# Patient Record
Sex: MALE | Race: WHITE | NOT HISPANIC OR LATINO | Employment: OTHER | URBAN - METROPOLITAN AREA
[De-identification: names, ages, dates, MRNs, and addresses within clinical notes are randomized per-mention and may not be internally consistent; named-entity substitution may affect disease eponyms.]

---

## 2017-02-12 ENCOUNTER — HOSPITAL ENCOUNTER (EMERGENCY)
Facility: MEDICAL CENTER | Age: 72
End: 2017-02-12
Attending: EMERGENCY MEDICINE
Payer: MEDICARE

## 2017-02-12 VITALS
WEIGHT: 177.91 LBS | HEART RATE: 69 BPM | DIASTOLIC BLOOD PRESSURE: 60 MMHG | SYSTOLIC BLOOD PRESSURE: 103 MMHG | HEIGHT: 71 IN | BODY MASS INDEX: 24.91 KG/M2 | RESPIRATION RATE: 16 BRPM | OXYGEN SATURATION: 96 % | TEMPERATURE: 98.7 F

## 2017-02-12 DIAGNOSIS — S39.012A LUMBOSACRAL STRAIN, INITIAL ENCOUNTER: ICD-10-CM

## 2017-02-12 PROCEDURE — 99284 EMERGENCY DEPT VISIT MOD MDM: CPT

## 2017-02-12 PROCEDURE — 700111 HCHG RX REV CODE 636 W/ 250 OVERRIDE (IP): Performed by: EMERGENCY MEDICINE

## 2017-02-12 PROCEDURE — 96372 THER/PROPH/DIAG INJ SC/IM: CPT

## 2017-02-12 RX ORDER — METHYLPREDNISOLONE 4 MG/1
TABLET ORAL
Qty: 1 KIT | Refills: 0 | Status: SHIPPED | OUTPATIENT
Start: 2017-02-12 | End: 2018-08-14

## 2017-02-12 RX ORDER — KETOROLAC TROMETHAMINE 30 MG/ML
30 INJECTION, SOLUTION INTRAMUSCULAR; INTRAVENOUS ONCE
Status: COMPLETED | OUTPATIENT
Start: 2017-02-12 | End: 2017-02-12

## 2017-02-12 RX ORDER — TRAMADOL HYDROCHLORIDE 50 MG/1
50-100 TABLET ORAL EVERY 6 HOURS PRN
Qty: 20 TAB | Refills: 0 | Status: SHIPPED | OUTPATIENT
Start: 2017-02-12

## 2017-02-12 RX ADMIN — KETOROLAC TROMETHAMINE 30 MG: 30 INJECTION, SOLUTION INTRAMUSCULAR; INTRAVENOUS at 10:06

## 2017-02-12 ASSESSMENT — PAIN SCALES - GENERAL
PAINLEVEL_OUTOF10: 9
PAINLEVEL_OUTOF10: 7

## 2017-02-12 NOTE — ED AVS SNAPSHOT
2/12/2017          Roger Render  [address] [facility] [place] [address]    Dear Roger:    Critical access hospital wants to ensure your discharge home is safe and you or your loved ones have had all your questions answered regarding your care after you leave the hospital.    You may receive a telephone call within two days of your discharge.  This call is to make certain you understand your discharge instructions as well as ensure we provided you with the best care possible during your stay with us.     The call will only last approximately 3-5 minutes and will be done by a nurse.    Once again, we want to ensure your discharge home is safe and that you have a clear understanding of any next steps in your care.  If you have any questions or concerns, please do not hesitate to contact us, we are here for you.  Thank you for choosing [facility] for your healthcare needs.    Sincerely,    Olegario Mayers    [facility]

## 2017-02-12 NOTE — ED AVS SNAPSHOT
[employer] Access Code: [email or web address]  Expires: 3/14/2017 10:25 AM    [employer]  A secure, online tool to manage your health information     [employer]’s [employer]® is a secure, online tool that connects you to your personalized health information from the privacy of your home -- day or night - making it very easy for you to manage your healthcare. Once the activation process is completed, you can even access your medical information using the [employer] raquel, which is available for free in the Apple Raquel store or Google Play store.     [employer] provides the following levels of access (as shown below):   My Chart Features   [facility] Primary Care Doctor [facility]  Specialists [facility]  Urgent  Care [facility]  Primary Care  Doctor   Email your healthcare team securely and privately 24/7 X X X X   Manage appointments: schedule your next appointment; view details of past/upcoming appointments X      Request prescription refills. X      View recent personal medical records, including lab and immunizations X X X X   View health record, including health history, allergies, medications X X X X   Read reports about your outpatient visits, procedures, consult and ER notes X X X X   See your discharge summary, which is a recap of your hospital and/or ER visit that includes your diagnosis, lab results, and care plan. X X       How to register for [employer]:  1. Go to  [email or web address].  2. Click on the Sign Up Now box, which takes you to the New Member Sign Up page. You will need to provide the following information:  a. Enter your [employer] Access Code exactly as it appears at the top of this page. (You will not need to use this code after you’ve completed the sign-up process. If you do not sign up before the expiration date, you must request a new code.)   b. Enter your date of birth.   c. Enter your home email address.   d. Click Submit, and follow the next screen’s instructions.  3. Create a [employer] ID. This will be your [employer]  login ID and cannot be changed, so think of one that is secure and easy to remember.  4. Create a [employer] password. You can change your password at any time.  5. Enter your Password Reset Question and Answer. This can be used at a later time if you forget your password.   6. Enter your e-mail address. This allows you to receive e-mail notifications when new information is available in [employer].  7. Click Sign Up. You can now view your health information.    For assistance activating your [employer] account, call [phone]

## 2017-02-12 NOTE — ED AVS SNAPSHOT
Home Care Instructions                                                                                                                Roger Holliday   MRN: [id number]    Department:  [facility], Emergency Dept   Date of Visit:  2/12/2017            [facility], Emergency Dept    [address] [place] [address]    Phone:  [phone]      You were seen by     Rolando Mcguire M.D.      Your Diagnosis Was     Lumbosacral strain, initial encounter     S39.012A       These are the medications you received during your hospitalization from 02/12/2017 0936 to 02/12/2017 1026     Date/Time Order Dose Route Action    02/12/2017 1006 ketorolac (TORADOL) injection 30 mg 30 mg Intramuscular Given      Follow-up Information     1. Follow up with Giovanni Khan M.D. In 2 days.    Specialty:  Phys Med and Rehab    Why:  As needed, If symptoms worsen    Contact information    [address]  [place] [id number] [phone]        Medication Information     Review all of your home medications and newly ordered medications with your primary doctor and/or pharmacist as soon as possible. Follow medication instructions as directed by your doctor and/or pharmacist.     Please keep your complete medication list with you and share with your physician. Update the information when medications are discontinued, doses are changed, or new medications (including over-the-counter products) are added; and carry medication information at all times in the event of emergency situations.               Medication List      START taking these medications        Instructions    MethylPREDNISolone 4 MG Tbpk   Commonly known as:  MEDROL DOSEPAK    Use as directed       tramadol 50 MG Tabs   Commonly known as:  ULTRAM    Take 1-2 Tabs by mouth every 6 hours as needed for Moderate Pain (pain).   Dose:   mg                 Discharge Instructions       Back Pain, Adult  Back pain is  very common in adults. The cause of back pain is rarely dangerous and the pain often gets better over time. The cause of your back pain may not be known. Some common causes of back pain include:  · Strain of the muscles or ligaments supporting the spine.  · Wear and tear (degeneration) of the spinal disks.  · Arthritis.  · Direct injury to the back.  For many people, back pain may return. Since back pain is rarely dangerous, most people can learn to manage this condition on their own.  HOME CARE INSTRUCTIONS  Watch your back pain for any changes. The following actions may help to lessen any discomfort you are feeling:  · Remain active. It is stressful on your back to sit or  one place for long periods of time. Do not sit, drive, or  one place for more than 30 minutes at a time. Take short walks on even surfaces as soon as you are able. Try to increase the length of time you walk each day.  · Exercise regularly as directed by your health care provider. Exercise helps your back heal faster. It also helps avoid future injury by keeping your muscles strong and flexible.  · Do not stay in bed. Resting more than 1-2 days can delay your recovery.  · Pay attention to your body when you bend and lift. The most comfortable positions are those that put less stress on your recovering back. Always use proper lifting techniques, including:  ¨ Bending your knees.  ¨ Keeping the load close to your body.  ¨ Avoiding twisting.  · Find a comfortable position to sleep. Use a firm mattress and lie on your side with your knees slightly bent. If you lie on your back, put a pillow under your knees.  · Avoid feeling anxious or stressed. Stress increases muscle tension and can worsen back pain. It is important to recognize when you are anxious or stressed and learn ways to manage it, such as with exercise.  · Take medicines only as directed by your health care provider. Over-the-counter medicines to reduce pain and  inflammation are often the most helpful. Your health care provider may prescribe muscle relaxant drugs. These medicines help dull your pain so you can more quickly return to your normal activities and healthy exercise.  · Apply ice to the injured area:  ¨ Put ice in a plastic bag.  ¨ Place a towel between your skin and the bag.  ¨ Leave the ice on for 20 minutes, 2-3 times a day for the first 2-3 days. After that, ice and heat may be alternated to reduce pain and spasms.  · Maintain a healthy weight. Excess weight puts extra stress on your back and makes it difficult to maintain good posture.  SEEK MEDICAL CARE IF:  · You have pain that is not relieved with rest or medicine.  · You have increasing pain going down into the legs or buttocks.  · You have pain that does not improve in one week.  · You have night pain.  · You lose weight.  · You have a fever or chills.  SEEK IMMEDIATE MEDICAL CARE IF:   · You develop new bowel or bladder control problems.  · You have unusual weakness or numbness in your arms or legs.  · You develop nausea or vomiting.  · You develop abdominal pain.  · You feel faint.     This information is not intended to replace advice given to you by your health care provider. Make sure you discuss any questions you have with your health care provider.     Document Released: 12/18/2006 Document Revised: 01/08/2016 Document Reviewed: 04/21/2015  Elsevier Interactive Patient Education ©2016 [employer]            Patient Information     Patient Information    Following emergency treatment: all patient requiring follow-up care must return either to a private physician or a clinic if your condition worsens before you are able to obtain further medical attention, please return to the emergency room.     Billing Information    At [facility], we work to make the billing process streamlined for our patients.  Our Representatives are here to answer any questions you may have regarding your hospital bill.   If you have insurance coverage and have supplied your insurance information to us, we will submit a claim to your insurer on your behalf.  Should you have any questions regarding your bill, we can be reached online or by phone as follows:  Online: You are able pay your bills online or live chat with our representatives about any billing questions you may have. We are here to help Monday - Friday from 8:00am to 7:30pm and 9:00am - 12:00pm on Saturdays.  Please visit [email or web address]  for more information.   Phone:  [phone] or [phone]    Please note that your emergency physician, surgeon, pathologist, radiologist, anesthesiologist, and other specialists are not employed by [facility] and will therefore bill separately for their services.  Please contact them directly for any questions concerning their bills at the numbers below:     Emergency Physician Services:  [phone]  [facility]:  [phone]  [facility]:  [phone]  [facility]:  [phone]    1. Your final bill may vary from the amount quoted upon discharge if all procedures are not complete at that time, or if your doctor has additional procedures of which we are not aware. You will receive an additional bill if you return to the Emergency Department at [facility] for suture removal regardless of the facility of which the sutures were placed.     2. Please arrange for settlement of this account at the emergency registration.    3. All self-pay accounts are due in full at the time of treatment.  If you are unable to meet this obligation then payment is expected within 4-5 days.     4. If you have had radiology studies (CT, X-ray, Ultrasound, MRI), you have received a preliminary result during your emergency department visit. Please contact the radiology department [phone] to receive a copy of your final result. Please discuss the Final result with  your primary physician or with the follow up physician provided.     Crisis Hotline:  [place] Crisis Hotline:  4-472-RLPRBYG or 1-462.573.2249  [place] Crisis Hotline:    1-369.681.6035 or 831-069-3459         ED Discharge Follow Up Questions    1. In order to provide you with very good care, we would like to follow up with a phone call in the next few days.  May we have your permission to contact you?     YES /  NO    2. What is the best phone number to call you? (       )_____-__________    3. What is the best time to call you?      Morning  /  Afternoon  /  Evening                   Patient Signature:  ____________________________________________________________    Date:  ____________________________________________________________

## 2017-02-12 NOTE — ED NOTES
Discharged to home with instructions and prescriptions and outpatient MRI slip  Wife to drive home

## 2017-02-12 NOTE — ED PROVIDER NOTES
ED Provider Note    CHIEF COMPLAINT  Chief Complaint   Patient presents with   • Back Pain     low back pain started one week ago while shoveling snow       HPI  Roger Holliday is a 71 y.o. male who presents for evaluation of low back pain. The patient reports she was shoveling snow about 4-5 days ago. This was going on for 3-4 hours. He is generally tired and has some back tightness but did not report any direct blunt or penetrating trauma. Yesterday he apparently reached down to grab something and had an acute severe twinge of low back pain. Again no fall no direct blunt or penetrating trauma. Denies any numbness weakness tingling or incontinence. No prior history of lumbar back surgery although he has had several cervical spine surgeries. He had a similar exacerbation about 4 years ago. Is followed by Dr. Khan, had an MRI of his lumbar spine with some general DJD but did much better after steroids and rehab. He has no other complaints currently    REVIEW OF SYSTEMS  See HPI for further details. No numbness tingling weakness fevers or chills All other systems are negative.     PAST MEDICAL HISTORY  Past Medical History   Diagnosis Date   • Coleman's neuroma      left       FAMILY HISTORY  No history of bleeding disorder    SOCIAL HISTORY  Social History     Social History   • Marital Status:      Spouse Name: N/A   • Number of Children: N/A   • Years of Education: N/A     Social History Main Topics   • Smoking status: Never Smoker    • Smokeless tobacco: None   • Alcohol Use: Yes   • Drug Use: No   • Sexual Activity: Not Asked     Other Topics Concern   • None     Social History Narrative    nonsmoker no IV drugs occasional alcohol    SURGICAL HISTORY  Past Surgical History   Procedure Laterality Date   • Laminotomy       cervical spine 2010 [facility]   • Hernia repair       Bilateral Inguinal hernia repair, residual neuropathic symptoms on  right, seen at [facility]       CURRENT MEDICATIONS  No regular  "medications    ALLERGIES  No Known Allergies    PHYSICAL EXAM  VITAL SIGNS: /68 mmHg  Pulse 79  Temp(Src) 37.1 °C (98.7 °F)  Resp 18  Ht 1.803 m (5' 10.98\")  Wt 80.7 kg (177 lb 14.6 oz)  BMI 24.82 kg/m2  SpO2 96% Room air O2: 96    Constitutional: Well developed, Well nourished, No acute distress, Non-toxic appearance.   HENT: Normocephalic, Atraumatic, Bilateral external ears normal, Oropharynx moist, No oral exudates, Nose normal.   Eyes: PERRLA, EOMI, Conjunctiva normal, No discharge.   Neck: Normal range of motion, No tenderness, Supple, No stridor.   Cardiovascular: Normal heart rate, Normal rhythm, No murmurs, No rubs, No gallops.   Thorax & Lungs: Normal breath sounds, No respiratory distress, No wheezing, No chest tenderness.   Abdomen: Bowel sounds normal, Soft, No tenderness, No masses, No pulsatile masses.   Skin: Warm, Dry, No erythema, No rash.   Back: No midline thoracolumbar bony tenderness significant paraspinal spasm at L3-L4, No CVA tenderness.   Genitalia: Groin sensation is normal  Extremities: Intact distal pulses, No edema, No tenderness, No cyanosis, No clubbing.   Musculoskeletal: Good range of motion in all major joints. No tenderness to palpation or major deformities noted.   Neurologic: Alert & oriented x 3, Normal motor function, Normal sensory function, No focal deficits noted. Bilateral patellar DTRs intact   Psychiatric: Anxious.     COURSE & MEDICAL DECISION MAKING  Pertinent Labs & Imaging studies reviewed. (See chart for details)  Review the patient's records. He had a lumbar spine MRI 3-4 years ago which I reviewed. The images were available but not read but there did not appear to be any obvious bulging disc. Here he has no neurological deficits. I did not feel he required repeat acute imaging. X-ray and CT scan would be essentially not useful and an MRI would be more useful. He was given intramuscular Toradol. Ultimately prescription for Medrol Dosepak and Flexeril. " I will give him an outpatient prescription for repeat MRI and follow up with Dr. Khan. Return precautions have been reviewed    FINAL IMPRESSION  1.   1. Lumbosacral strain, initial encounter          Electronically signed by: Rolando Mcguire, 2/12/2017 10:25 AM

## 2017-02-12 NOTE — ED NOTES
"Chief Complaint   Patient presents with   • Back Pain     low back pain started one week ago while shoveling snow     /68 mmHg  Pulse 79  Temp(Src) 37.1 °C (98.7 °F)  Resp 18  Ht 1.803 m (5' 10.98\")  Wt 80.7 kg (177 lb 14.6 oz)  BMI 24.82 kg/m2  SpO2 96%    Pt felt low back pain last Saturday after shoveling snow for about three hours.  Friday, bent down to  a piece of plastic and felt a \"snap\" in lower back    "

## 2017-02-12 NOTE — DISCHARGE INSTRUCTIONS
Back Pain, Adult  Back pain is very common in adults. The cause of back pain is rarely dangerous and the pain often gets better over time. The cause of your back pain may not be known. Some common causes of back pain include:  · Strain of the muscles or ligaments supporting the spine.  · Wear and tear (degeneration) of the spinal disks.  · Arthritis.  · Direct injury to the back.  For many people, back pain may return. Since back pain is rarely dangerous, most people can learn to manage this condition on their own.  HOME CARE INSTRUCTIONS  Watch your back pain for any changes. The following actions may help to lessen any discomfort you are feeling:  · Remain active. It is stressful on your back to sit or  one place for long periods of time. Do not sit, drive, or  one place for more than 30 minutes at a time. Take short walks on even surfaces as soon as you are able. Try to increase the length of time you walk each day.  · Exercise regularly as directed by your health care provider. Exercise helps your back heal faster. It also helps avoid future injury by keeping your muscles strong and flexible.  · Do not stay in bed. Resting more than 1-2 days can delay your recovery.  · Pay attention to your body when you bend and lift. The most comfortable positions are those that put less stress on your recovering back. Always use proper lifting techniques, including:  ¨ Bending your knees.  ¨ Keeping the load close to your body.  ¨ Avoiding twisting.  · Find a comfortable position to sleep. Use a firm mattress and lie on your side with your knees slightly bent. If you lie on your back, put a pillow under your knees.  · Avoid feeling anxious or stressed. Stress increases muscle tension and can worsen back pain. It is important to recognize when you are anxious or stressed and learn ways to manage it, such as with exercise.  · Take medicines only as directed by your health care provider. Over-the-counter  medicines to reduce pain and inflammation are often the most helpful. Your health care provider may prescribe muscle relaxant drugs. These medicines help dull your pain so you can more quickly return to your normal activities and healthy exercise.  · Apply ice to the injured area:  ¨ Put ice in a plastic bag.  ¨ Place a towel between your skin and the bag.  ¨ Leave the ice on for 20 minutes, 2-3 times a day for the first 2-3 days. After that, ice and heat may be alternated to reduce pain and spasms.  · Maintain a healthy weight. Excess weight puts extra stress on your back and makes it difficult to maintain good posture.  SEEK MEDICAL CARE IF:  · You have pain that is not relieved with rest or medicine.  · You have increasing pain going down into the legs or buttocks.  · You have pain that does not improve in one week.  · You have night pain.  · You lose weight.  · You have a fever or chills.  SEEK IMMEDIATE MEDICAL CARE IF:   · You develop new bowel or bladder control problems.  · You have unusual weakness or numbness in your arms or legs.  · You develop nausea or vomiting.  · You develop abdominal pain.  · You feel faint.     This information is not intended to replace advice given to you by your health care provider. Make sure you discuss any questions you have with your health care provider.     Document Released: 12/18/2006 Document Revised: 01/08/2016 Document Reviewed: 04/21/2015  [employer] Interactive Patient Education ©2016 [employer]

## 2017-03-01 ENCOUNTER — OFFICE VISIT (OUTPATIENT)
Dept: OTHER | Facility: IMAGING CENTER | Age: 72
End: 2017-03-01

## 2017-03-01 DIAGNOSIS — M54.50 CHRONIC LEFT-SIDED LOW BACK PAIN WITHOUT SCIATICA: ICD-10-CM

## 2017-03-01 DIAGNOSIS — G89.29 CHRONIC LEFT-SIDED LOW BACK PAIN WITHOUT SCIATICA: ICD-10-CM

## 2017-03-01 PROCEDURE — 97813 ACUP 1/> W/ESTIM 1ST 15 MIN: CPT | Performed by: FAMILY MEDICINE

## 2017-03-01 PROCEDURE — 99203 OFFICE O/P NEW LOW 30 MIN: CPT | Mod: 25 | Performed by: FAMILY MEDICINE

## 2017-03-01 PROCEDURE — 97811 ACUP 1/> W/O ESTIM EA ADD 15: CPT | Performed by: FAMILY MEDICINE

## 2017-03-01 NOTE — PATIENT INSTRUCTIONS
Have encouraged the patient to rest after the acupuncture session today - taking naps or going to sleep early as necessary.  Increase intake of water and refrain from strenuous activities.  Patient may expect to feel transient worsening of symptoms, but this should resolve to benefit in the next day or two after treatment.    The side effects of Acupuncture needle insertion include: minor bruising, bleeding, or pain at the site of needle insertion.  If more worrisome symptoms, such as continued bleeding, severe bruising, or continue pain or altered sensation persist, please contact [facility] office @ [phone]

## 2017-03-01 NOTE — PROGRESS NOTES
[facility] Medical Acupuncture Progress Note  [address]  [place] [address]  Dept: [phone]      Patient Name: Roger Holliday   MRN: [id number]  YOB: 1945  PCP: Curly Chou M.D.  Date of Service: 3/1/2017  1:59 PM    CC Left lower SI pain   HPI Patient is a 72 yo  male with chronic lower back pain that was previously treated with acupuncture 10 years ago at a local [place] acupuncturist who has since passed away.  He has been renting at [place] while waiting for his house being built in [place], [place].  He has been shoveling last month and has one exacerbation event.  Since then he has been receiving PT which helped some degree but still hurts quite a bit.  Since he also has quite severe obstructuctive sleep apnea he has to sleep recumbently which has aggravated his pain.    He read online that Dr. Chaudhry has been meeting his philosophy so he really wanted to get in with Dr. Chaudhry but is unable to do so in a timely manner.  He is wondering if he would be able to switch back to Dr. Chaudhry when avaliable.   ROS Birthplace: Not asked  Color:  Season:  -handed  Scars:   PMH Past Medical History   Diagnosis Date   • Coleman's neuroma      left     Past Surgical History   Procedure Laterality Date   • Laminotomy       cervical spine 2010 [facility]   • Hernia repair       Bilateral Inguinal hernia repair, residual neuropathic symptoms on  right, seen at [facility] Social History     Social History   • Marital Status:      Spouse Name: N/A   • Number of Children: N/A   • Years of Education: N/A     Social History Main Topics   • Smoking status: Never Smoker    • Smokeless tobacco: Not on file   • Alcohol Use: Yes   • Drug Use: No   • Sexual Activity: Not on file     Other Topics Concern   • Not on file     Social History Narrative      MEDS Current Outpatient Prescriptions on File Prior to Visit   Medication Sig Dispense Refill   • MethylPREDNISolone (MEDROL DOSEPAK) 4 MG Tablet Therapy  Pack Use as directed 1 Kit 0   • tramadol (ULTRAM) 50 MG Tab Take 1-2 Tabs by mouth every 6 hours as needed for Moderate Pain (pain). 20 Tab 0     No current facility-administered medications on file prior to visit.      ALLERGIES No Known Allergies   PE Caity Exam: Stomach Qi: (+) pecking radial pulse  (+) Oketsu, (+) Immune,   (R) Adrenal - B/LKid16 St9 DaiMai ASIS Kid2         Assessment Eastern Liver/blood stagnation, Stomach qi imbalance, Immune imbalance, Adrenal exhaustion.    Western Encounter Diagnoses   Name Primary?   • Chronic left-sided low back pain without sciatica                   Plan Set 1: Left (Lv4, Lu5), B/L LI10-11 area  Set 2: L (Tw5 + Gb40), R (LI 4.5, [place] ge, TW 3.5, SI 4)  Set 3: R (TW 3--> 10, LR 3.3 --> 8, KD 7 --> KD 10), L (PC 5--> 9, GB 39 ++> 34, BL 59 ++> BL 40)  Set 4: Ronak ding binh xi, Monika ryan, B/L FMS     Jama Desai D.O.    >16 minutes of this 30 minute interview were spent in discussing the benefits and utility of acupuncture.  I answered patient questions about efficacy, safety, and what to expect during a treatment, and the likely number of treatments needed.  Patient will schedule another appointment to return for treatment.

## 2017-03-01 NOTE — MR AVS SNAPSHOT
Roger Holliday   3/1/2017 1:30 PM   Office Visit   MRN: [id number]    Department:  Acupuncture Med   Dept Phone:  [phone]    Description:  Male : 1945   Provider:  Jama Desai D.O.           Allergies as of 3/1/2017     No Known Allergies      You were diagnosed with     Chronic left-sided low back pain without sciatica   [[id number]]         Vital Signs     Smoking Status                   Never Smoker            Basic Information     Date Of Birth Sex Race Ethnicity Preferred Language    1945 Male White Non- English      Your appointments     Mar 07, 2017  1:00 PM   ACUPUNCTURE 30 with Jama Desai D.O.   [facility] (--)    [address]  [place] [id number] [phone]            Mar 14, 2017  1:00 PM   ACUPUNCTURE 30 with Jama Desai D.O.   [facility] (--)    [email or web address]  [place] [address]   [phone]              Problem List              ICD-10-CM Priority Class Noted - Resolved    Left-sided low back pain without sciatica M54.5   12/3/2013 - Present      Health Maintenance        Date Due Completion Dates    IMM DTaP/Tdap/Td Vaccine (1 - Tdap) 1965 ---    COLONOSCOPY 1996 ---    IMM ZOSTER VACCINE 2006 ---    IMM PNEUMOCOCCAL 65+ (ADULT) LOW/MEDIUM RISK SERIES (1 of 2 - PCV13) 2011 ---    IMM INFLUENZA (1) 2016 ---            Current Immunizations     No immunizations on file.      Below and/or attached are the medications your provider expects you to take. Review all of your home medications and newly ordered medications with your provider and/or pharmacist. Follow medication instructions as directed by your provider and/or pharmacist. Please keep your medication list with you and share with your provider. Update the information when medications are discontinued, doses are changed, or new medications (including over-the-counter products) are added; and  carry medication information at all times in the event of emergency situations     Allergies:  No Known Allergies          Medications  Valid as of: March 01, 2017 -  2:44 PM    Generic Name Brand Name Tablet Size Instructions for use    MethylPREDNISolone (Tablet Therapy Pack) MEDROL DOSEPAK 4 MG Use as directed        TraMADol HCl (Tab) ULTRAM 50 MG Take 1-2 Tabs by mouth every 6 hours as needed for Moderate Pain (pain).        .                 Medicines prescribed today were sent to:     [facility]ACY #[id number] - [place] - [address]    [address] [place] [address]    Phone: [phone] Fax: [phone]    Open 24 Hours?: No      Medication refill instructions:       If your prescription bottle indicates you have medication refills left, it is not necessary to call your provider’s office. Please contact your pharmacy and they will refill your medication.    If your prescription bottle indicates you do not have any refills left, you may request refills at any time through one of the following ways: The online [employer] system (except Urgent Care), by calling your provider’s office, or by asking your pharmacy to contact your provider’s office with a refill request. Medication refills are processed only during regular business hours and may not be available until the next business day. Your provider may request additional information or to have a follow-up visit with you prior to refilling your medication.   *Please Note: Medication refills are assigned a new Rx number when refilled electronically. Your pharmacy may indicate that no refills were authorized even though a new prescription for the same medication is available at the pharmacy. Please request the medicine by name with the pharmacy before contacting your provider for a refill.        Instructions    Have encouraged the patient to rest after the acupuncture session today - taking naps or going to sleep early as necessary.  Increase intake of  water and refrain from strenuous activities.  Patient may expect to feel transient worsening of symptoms, but this should resolve to benefit in the next day or two after treatment.    The side effects of Acupuncture needle insertion include: minor bruising, bleeding, or pain at the site of needle insertion.  If more worrisome symptoms, such as continued bleeding, severe bruising, or continue pain or altered sensation persist, please contact [facility] Medical Acupuncture office @ [phone]            [email or web address].com Access Code: [email or web address]  Expires: 3/14/2017 10:25 AM    [email or web address].com  A secure, online tool to manage your health information     [employer]’s [email or web address]® is a secure, online tool that connects you to your personalized health information from the privacy of your home -- day or night - making it very easy for you to manage your healthcare. Once the activation process is completed, you can even access your medical information using the Carhoots.com raquel, which is available for free in the Apple Raquel store or Google Play store.     [email or web address].com provides the following levels of access (as shown below):   My Chart Features   [facility] Primary Care Doctor [facility]  Specialists [facility]  Urgent  Care Non-[facility]  Primary Care  Doctor   Email your healthcare team securely and privately 24/7 X X X    Manage appointments: schedule your next appointment; view details of past/upcoming appointments X      Request prescription refills. X      View recent personal medical records, including lab and immunizations X X X X   View health record, including health history, allergies, medications X X X X   Read reports about your outpatient visits, procedures, consult and ER notes X X X X   See your discharge summary, which is a recap of your hospital and/or ER visit that includes your diagnosis, lab results, and care plan. X X       How to register for [email or web address]:  1. Go to  [email or web address].  2. Click on the Sign Up Now box, which takes you  to the New Member Sign Up page. You will need to provide the following information:  a. Enter your Snappy shuttle Access Code exactly as it appears at the top of this page. (You will not need to use this code after you’ve completed the sign-up process. If you do not sign up before the expiration date, you must request a new code.)   b. Enter your date of birth.   c. Enter your home email address.   d. Click Submit, and follow the next screen’s instructions.  3. Create a Snappy shuttle ID. This will be your Snappy shuttle login ID and cannot be changed, so think of one that is secure and easy to remember.  4. Create a Snappy shuttle password. You can change your password at any time.  5. Enter your Password Reset Question and Answer. This can be used at a later time if you forget your password.   6. Enter your e-mail address. This allows you to receive e-mail notifications when new information is available in Snappy shuttle.  7. Click Sign Up. You can now view your health information.    For assistance activating your Snappy shuttle account, call [phone]

## 2017-03-07 ENCOUNTER — OFFICE VISIT (OUTPATIENT)
Dept: OTHER | Facility: IMAGING CENTER | Age: 72
End: 2017-03-07

## 2017-03-07 DIAGNOSIS — G89.29 CHRONIC LEFT-SIDED LOW BACK PAIN WITHOUT SCIATICA: ICD-10-CM

## 2017-03-07 DIAGNOSIS — M54.50 CHRONIC LEFT-SIDED LOW BACK PAIN WITHOUT SCIATICA: ICD-10-CM

## 2017-03-07 PROCEDURE — 97813 ACUP 1/> W/ESTIM 1ST 15 MIN: CPT | Performed by: FAMILY MEDICINE

## 2017-03-07 PROCEDURE — 99213 OFFICE O/P EST LOW 20 MIN: CPT | Mod: 25 | Performed by: FAMILY MEDICINE

## 2017-03-07 PROCEDURE — 97811 ACUP 1/> W/O ESTIM EA ADD 15: CPT | Performed by: FAMILY MEDICINE

## 2017-03-07 NOTE — PROGRESS NOTES
[facility] Medical Acupuncture Progress Note  [address]  [place] [address]  Dept: [phone]      Patient Name: Roger Holliday   MRN: [id number]  YOB: 1945  PCP: Curly Chou M.D.  Date of Service: 3/7/2017 12:51 PM    CC Left lower SI pain   HPI Patient is a 70 yo  male with chronic lower back pain that was previously treated with acupuncture 10 years ago at a local [place] acupuncturist who has since passed away.  He has been renting at [place] while waiting for his house being built in [place], [place].  He has been shoveling last month and has one exacerbation event.  Since then he has been receiving PT which helped some degree but still hurts quite a bit.  Since he also has quite severe obstructuctive sleep apnea he has to sleep recumbently which has aggravated his pain.    Since last visit he has reported reduction of his back pain.  He only has taken one Aleve last night for sleeping.  His right back is painful at SI level.   ROS Birthplace: Not asked  Color:  Season:  -handed  Scars:   PMH Past Medical History   Diagnosis Date   • Coleman's neuroma      left     Past Surgical History   Procedure Laterality Date   • Laminotomy       cervical spine 2010 [facility]   • Hernia repair       Bilateral Inguinal hernia repair, residual neuropathic symptoms on  right, seen at [facility] Social History     Social History   • Marital Status:      Spouse Name: N/A   • Number of Children: N/A   • Years of Education: N/A     Social History Main Topics   • Smoking status: Never Smoker    • Smokeless tobacco: Not on file   • Alcohol Use: 0.0 oz/week     0 Standard drinks or equivalent per week   • Drug Use: No   • Sexual Activity: Not on file     Other Topics Concern   • Not on file     Social History Narrative      MEDS Current Outpatient Prescriptions on File Prior to Visit   Medication Sig Dispense Refill   • MethylPREDNISolone (MEDROL DOSEPAK) 4 MG Tablet Therapy Pack Use as directed  1 Kit 0   • tramadol (ULTRAM) 50 MG Tab Take 1-2 Tabs by mouth every 6 hours as needed for Moderate Pain (pain). 20 Tab 0     No current facility-administered medications on file prior to visit.      ALLERGIES No Known Allergies   PE Caity Exam: Stomach Qi: (+) pecking radial pulse  (+) Oketsu, (+) Immune,   (R) Adrenal - B/LKid16 St9 DaiMai ASIS Kid2         Assessment Eastern Liver/blood stagnation, Stomach qi imbalance, Immune imbalance, Adrenal exhaustion.    Western Encounter Diagnoses   Name Primary?   • Chronic left-sided low back pain without sciatica                   Plan Set 1: Left (Lv4, Lu5), B/L LI10-11 area  Set 2: L (Tw5 + Gb40), R (LI 4.5, [place] ge, TW 3.5, SI 4)  Set 3: L (TW 3--> 10, LR 3.3 --> 8, KD 7 --> KD 10), R (PC 5--> 9, GB 39 ++> 34, BL 59 ++> BL 40)  Set 4: Ronak ding binh xi, Monika ryan, B/L FMS     Jama Desai D.O.    >More than 15 minutes were spent discussing with the patient about his condition which did not include procedure time.

## 2017-03-07 NOTE — MR AVS SNAPSHOT
Roger Holliday   3/7/2017 1:00 PM   Office Visit   MRN: [id number]    Department:  Acupuncture Med   Dept Phone:  [phone]    Description:  Male : 1945   Provider:  Jama Desai D.O.           Allergies as of 3/7/2017     No Known Allergies      You were diagnosed with     Chronic left-sided low back pain without sciatica   [[id number]]         Vital Signs     Smoking Status                   Never Smoker            Basic Information     Date Of Birth Sex Race Ethnicity Preferred Language    1945 Male White Non- English      Your appointments     Mar 14, 2017  1:00 PM   ACUPUNCTURE 30 with Jama Desai D.O.   [facility] (--)    [address] [facility]  [facility] [address] [phone]              Problem List              ICD-10-CM Priority Class Noted - Resolved    Left-sided low back pain without sciatica M54.5   12/3/2013 - Present      Health Maintenance        Date Due Completion Dates    IMM DTaP/Tdap/Td Vaccine (1 - Tdap) 1965 ---    COLONOSCOPY 1996 ---    IMM ZOSTER VACCINE 2006 ---    IMM PNEUMOCOCCAL 65+ (ADULT) LOW/MEDIUM RISK SERIES (1 of 2 - PCV13) 2011 ---    IMM INFLUENZA (1) 2016 ---            Current Immunizations     No immunizations on file.      Below and/or attached are the medications your provider expects you to take. Review all of your home medications and newly ordered medications with your provider and/or pharmacist. Follow medication instructions as directed by your provider and/or pharmacist. Please keep your medication list with you and share with your provider. Update the information when medications are discontinued, doses are changed, or new medications (including over-the-counter products) are added; and carry medication information at all times in the event of emergency situations     Allergies:  No Known Allergies          Medications  Valid as of: 2017 -  2:02 PM    Generic Name  Brand Name Tablet Size Instructions for use    MethylPREDNISolone (Tablet Therapy Pack) MEDROL DOSEPAK 4 MG Use as directed        TraMADol HCl (Tab) ULTRAM 50 MG Take 1-2 Tabs by mouth every 6 hours as needed for Moderate Pain (pain).        .                 Medicines prescribed today were sent to:     [facility]/PHARMACY #[id number] - [place], [place] - [address]    [address] [place] [address]    Phone: [phone] Fax: [phone]    Open 24 Hours?: No      Medication refill instructions:       If your prescription bottle indicates you have medication refills left, it is not necessary to call your provider’s office. Please contact your pharmacy and they will refill your medication.    If your prescription bottle indicates you do not have any refills left, you may request refills at any time through one of the following ways: The online [employer] system (except Urgent Care), by calling your provider’s office, or by asking your pharmacy to contact your provider’s office with a refill request. Medication refills are processed only during regular business hours and may not be available until the next business day. Your provider may request additional information or to have a follow-up visit with you prior to refilling your medication.   *Please Note: Medication refills are assigned a new Rx number when refilled electronically. Your pharmacy may indicate that no refills were authorized even though a new prescription for the same medication is available at the pharmacy. Please request the medicine by name with the pharmacy before contacting your provider for a refill.        Instructions    Have encouraged the patient to rest after the acupuncture session today - taking naps or going to sleep early as necessary.  Increase intake of water and refrain from strenuous activities.  Patient may expect to feel transient worsening of symptoms, but this should resolve to benefit in the next day or two after treatment.    The  side effects of Acupuncture needle insertion include: minor bruising, bleeding, or pain at the site of needle insertion.  If more worrisome symptoms, such as continued bleeding, severe bruising, or continue pain or altered sensation persist, please contact [facility] Medical Acupuncture office @ [phone]            [employer] Devices Access Code: [email or web address]  Expires: 3/14/2017 10:25 AM    [employer] Devices  A secure, online tool to manage your health information     [employer]’s Air Ion Devices® is a secure, online tool that connects you to your personalized health information from the privacy of your home -- day or night - making it very easy for you to manage your healthcare. Once the activation process is completed, you can even access your medical information using the [employer] Devices raquel, which is available for free in the Apple Raquel store or Google Play store.     [employer] provides the following levels of access (as shown below):   My Chart Features   [facility] Primary Care Doctor [facility]  Specialists [facility]  Urgent  Care Non-[facility]  Primary Care  Doctor   Email your healthcare team securely and privately 24/7 X X X    Manage appointments: schedule your next appointment; view details of past/upcoming appointments X      Request prescription refills. X      View recent personal medical records, including lab and immunizations X X X X   View health record, including health history, allergies, medications X X X X   Read reports about your outpatient visits, procedures, consult and ER notes X X X X   See your discharge summary, which is a recap of your hospital and/or ER visit that includes your diagnosis, lab results, and care plan. X X       How to register for [employer]:  1. Go to  [email or web address].  2. Click on the Sign Up Now box, which takes you to the New Member Sign Up page. You will need to provide the following information:  a. Enter your Air [employer] Devices Access Code exactly as it appears at the top of this page. (You will not need to  use this code after you’ve completed the sign-up process. If you do not sign up before the expiration date, you must request a new code.)   b. Enter your date of birth.   c. Enter your home email address.   d. Click Submit, and follow the next screen’s instructions.  3. Create a Harbor BioSciencest ID. This will be your Harbor [employer] login ID and cannot be changed, so think of one that is secure and easy to remember.  4. Create a Knotch password. You can change your password at any time.  5. Enter your Password Reset Question and Answer. This can be used at a later time if you forget your password.   6. Enter your e-mail address. This allows you to receive e-mail notifications when new information is available in Knotch.  7. Click Sign Up. You can now view your health information.    For assistance activating your Knotch account, call [phone]

## 2017-03-07 NOTE — PATIENT INSTRUCTIONS
Have encouraged the patient to rest after the acupuncture session today - taking naps or going to sleep early as necessary.  Increase intake of water and refrain from strenuous activities.  Patient may expect to feel transient worsening of symptoms, but this should resolve to benefit in the next day or two after treatment.    The side effects of Acupuncture needle insertion include: minor bruising, bleeding, or pain at the site of needle insertion.  If more worrisome symptoms, such as continued bleeding, severe bruising, or continue pain or altered sensation persist, please contact [facility] office @ [phone]

## 2017-03-14 ENCOUNTER — OFFICE VISIT (OUTPATIENT)
Dept: OTHER | Facility: IMAGING CENTER | Age: 72
End: 2017-03-14

## 2017-03-14 DIAGNOSIS — M54.50 CHRONIC LEFT-SIDED LOW BACK PAIN WITHOUT SCIATICA: ICD-10-CM

## 2017-03-14 DIAGNOSIS — G89.29 CHRONIC LEFT-SIDED LOW BACK PAIN WITHOUT SCIATICA: ICD-10-CM

## 2017-03-14 PROCEDURE — 99213 OFFICE O/P EST LOW 20 MIN: CPT | Mod: 25 | Performed by: FAMILY MEDICINE

## 2017-03-14 PROCEDURE — 97811 ACUP 1/> W/O ESTIM EA ADD 15: CPT | Performed by: FAMILY MEDICINE

## 2017-03-14 PROCEDURE — 97813 ACUP 1/> W/ESTIM 1ST 15 MIN: CPT | Performed by: FAMILY MEDICINE

## 2017-03-14 NOTE — MR AVS SNAPSHOT
Roger Holliday   3/14/2017 1:00 PM   Appointment   MRN: [id number]    Department:  Acupuncture Med   Dept Phone:  [phone]    Description:  Male : 1945   Provider:  Jama Desai D.O.           Allergies as of 3/14/2017     No Known Allergies      Vital Signs     Smoking Status                   Never Smoker            Basic Information     Date Of Birth Sex Race Ethnicity Preferred Language    1945 Male White Non- English      Your appointments     2017  8:30 AM   New Patient with Giovanni Khan M.D.   [facility] (--)    [address]  [place] [address] [phone]           Please bring Photo ID, Insurance Cards, All Medication Bottles and copies of any legal documents (such as Living Will, Power of ) If speaking a language besides English please bring an adult . Please arrive 30 minutes prior for check in and registration. You will be receiving a confirmation call a few days before your appointment from our automated call confirmation system.              Problem List              ICD-10-CM Priority Class Noted - Resolved    Left-sided low back pain without sciatica M54.5   12/3/2013 - Present      Health Maintenance        Date Due Completion Dates    IMM DTaP/Tdap/Td Vaccine (1 - Tdap) 1965 ---    COLONOSCOPY 1996 ---    IMM ZOSTER VACCINE 2006 ---    IMM PNEUMOCOCCAL 65+ (ADULT) LOW/MEDIUM RISK SERIES (1 of 2 - PCV13) 2011 ---    IMM INFLUENZA (1) 2016 ---            Current Immunizations     No immunizations on file.      Below and/or attached are the medications your provider expects you to take. Review all of your home medications and newly ordered medications with your provider and/or pharmacist. Follow medication instructions as directed by your provider and/or pharmacist. Please keep your medication list with you and share with your provider. Update the information when medications are  discontinued, doses are changed, or new medications (including over-the-counter products) are added; and carry medication information at all times in the event of emergency situations     Allergies:  No Known Allergies          Medications  Valid as of: March 14, 2017 -  2:05 PM    Generic Name Brand Name Tablet Size Instructions for use    MethylPREDNISolone (Tablet Therapy Pack) MEDROL DOSEPAK 4 MG Use as directed        TraMADol HCl (Tab) ULTRAM 50 MG Take 1-2 Tabs by mouth every 6 hours as needed for Moderate Pain (pain).        .                 Medicines prescribed today were sent to:     [facility][id number] - [place] - [address]     [address] [place] [address]    Phone: [phone] Fax: [phone]    Open 24 Hours?: No      Medication refill instructions:       If your prescription bottle indicates you have medication refills left, it is not necessary to call your provider’s office. Please contact your pharmacy and they will refill your medication.    If your prescription bottle indicates you do not have any refills left, you may request refills at any time through one of the following ways: The online SnagFilms system (except Urgent Care), by calling your provider’s office, or by asking your pharmacy to contact your provider’s office with a refill request. Medication refills are processed only during regular business hours and may not be available until the next business day. Your provider may request additional information or to have a follow-up visit with you prior to refilling your medication.   *Please Note: Medication refills are assigned a new Rx number when refilled electronically. Your pharmacy may indicate that no refills were authorized even though a new prescription for the same medication is available at the pharmacy. Please request the medicine by name with the pharmacy before contacting your provider for a refill.           SnagFilms Access Code: [email or web address]  Expires:  4/13/2017  2:05 PM    Friendly Wager App  A secure, online tool to manage your health information     [employer]’s Friendly Wager App® is a secure, online tool that connects you to your personalized health information from the privacy of your home -- day or night - making it very easy for you to manage your healthcare. Once the activation process is completed, you can even access your medical information using the Friendly Wager App raquel, which is available for free in the Apple Raquel store or Google Play store.     Friendly Wager App provides the following levels of access (as shown below):   My Chart Features   [facility] Doctor [facility]  Specialists [facility]  Urgent  Care Non-[facility]  Primary Care  Doctor   Email your healthcare team securely and privately 24/7 X X X    Manage appointments: schedule your next appointment; view details of past/upcoming appointments X      Request prescription refills. X      View recent personal medical records, including lab and immunizations X X X X   View health record, including health history, allergies, medications X X X X   Read reports about your outpatient visits, procedures, consult and ER notes X X X X   See your discharge summary, which is a recap of your hospital and/or ER visit that includes your diagnosis, lab results, and care plan. X X       How to register for Friendly Wager App:  1. Go to  [email or web address].  2. Click on the Sign Up Now box, which takes you to the New Member Sign Up page. You will need to provide the following information:  a. Enter your Friendly Wager App Access Code exactly as it appears at the top of this page. (You will not need to use this code after you’ve completed the sign-up process. If you do not sign up before the expiration date, you must request a new code.)   b. Enter your date of birth.   c. Enter your home email address.   d. Click Submit, and follow the next screen’s instructions.  3. Create a Friendly Wager App ID. This will be your Friendly Wager App login ID and cannot be changed, so think of one  that is secure and easy to remember.  4. Create a [employer] password. You can change your password at any time.  5. Enter your Password Reset Question and Answer. This can be used at a later time if you forget your password.   6. Enter your e-mail address. This allows you to receive e-mail notifications when new information is available in ElementsLocal.  7. Click Sign Up. You can now view your health information.    For assistance activating your ElementsLocal account, call [phone]

## 2017-03-16 NOTE — PATIENT INSTRUCTIONS
Have encouraged the patient to rest after the acupuncture session today - taking naps or going to sleep early as necessary.  Increase intake of water and refrain from strenuous activities.  Patient may expect to feel transient worsening of symptoms, but this should resolve to benefit in the next day or two after treatment.    The side effects of Acupuncture needle insertion include: minor bruising, bleeding, or pain at the site of needle insertion.  If more worrisome symptoms, such as continued bleeding, severe bruising, or continue pain or altered sensation persist, please contact [facility] office @ [phone]

## 2017-03-16 NOTE — PROGRESS NOTES
[facility] Progress Note  [address]  [place] [address]  Dept: [phone]      Patient Name: Roger Holliday   MRN: [id number]  YOB: 1945  PCP: Curly Chou M.D.  Date of Service: 3/14/2017  1:46 PM    CC Left lower SI pain   HPI Patient is a 70 yo  male with chronic lower back pain that was previously treated with acupuncture 10 years ago at a local [place] acupuncturist who has since passed away.  He has been renting at [place] while waiting for his house being built in [place], [place].  He has been shoveling last month and has one exacerbation event.  Since then he has been receiving PT which helped some degree but still hurts quite a bit.  Since he also has quite severe obstructuctive sleep apnea he has to sleep recumbently which has aggravated his pain.    Since last visit he has reported reduction of his back pain still.  He has taken one Aleve last night to have a better night without tight back.  His right back is still painful at SI level.   ROS Birthplace: Not asked  Color:  Season:  -handed  Scars:   PMH Past Medical History   Diagnosis Date   • Coleman's neuroma      left     Past Surgical History   Procedure Laterality Date   • Laminotomy       cervical spine 2010 [facility]   • Hernia repair       Bilateral Inguinal hernia repair, residual neuropathic symptoms on  right, seen at [facility] Social History     Social History   • Marital Status:      Spouse Name: N/A   • Number of Children: N/A   • Years of Education: N/A     Social History Main Topics   • Smoking status: Never Smoker    • Smokeless tobacco: Not on file   • Alcohol Use: 0.0 oz/week     0 Standard drinks or equivalent per week   • Drug Use: No   • Sexual Activity: Not on file     Other Topics Concern   • Not on file     Social History Narrative      MEDS Current Outpatient Prescriptions on File Prior to Visit   Medication Sig Dispense Refill   • MethylPREDNISolone (MEDROL DOSEPAK) 4 MG  Tablet Therapy Pack Use as directed 1 Kit 0   • tramadol (ULTRAM) 50 MG Tab Take 1-2 Tabs by mouth every 6 hours as needed for Moderate Pain (pain). 20 Tab 0     No current facility-administered medications on file prior to visit.      ALLERGIES No Known Allergies   PE Caity Exam: Stomach Qi: (+) pecking radial pulse  (+) Oketsu, (+) Immune,   (R) Adrenal - B/LKid16 St9 DaiMai ASIS Kid2         Assessment Eastern Liver/blood stagnation, Stomach qi imbalance, Immune imbalance, Adrenal exhaustion.    Western Encounter Diagnoses   Name Primary?   • Chronic left-sided low back pain without sciatica                   Plan Set 1: Left (Lv4, Lu5), B/L LI10-11 area  Set 2: L (Tw5 + Gb40), R (LI 4.5, [place] ge, TW 3.5, SI 4)  Set 3: L (TW 3--> 10, LR 3.3 --> 8, KD 7 --> KD 10), R (PC 5--> 9, GB 39 ++> 34, BL 59 ++> BL 40)  Set 4: Ronak ding binh xi, Monika ryan, B/L FMS     Jama Desai D.O.    >More than 15 minutes were spent discussing with the patient about his condition which did not include procedure time.

## 2017-05-10 ENCOUNTER — APPOINTMENT (RX ONLY)
Dept: URBAN - NONMETROPOLITAN AREA CLINIC 1 | Facility: CLINIC | Age: 72
Setting detail: DERMATOLOGY
End: 2017-05-10

## 2017-05-10 DIAGNOSIS — L57.0 ACTINIC KERATOSIS: ICD-10-CM

## 2017-05-10 DIAGNOSIS — L81.4 OTHER MELANIN HYPERPIGMENTATION: ICD-10-CM

## 2017-05-10 DIAGNOSIS — D22 MELANOCYTIC NEVI: ICD-10-CM

## 2017-05-10 PROBLEM — D22.71 MELANOCYTIC NEVI OF RIGHT LOWER LIMB, INCLUDING HIP: Status: ACTIVE | Noted: 2017-05-10

## 2017-05-10 PROBLEM — D22.5 MELANOCYTIC NEVI OF TRUNK: Status: ACTIVE | Noted: 2017-05-10

## 2017-05-10 PROCEDURE — ? OBSERVATION

## 2017-05-10 PROCEDURE — 99214 OFFICE O/P EST MOD 30 MIN: CPT | Mod: 25

## 2017-05-10 PROCEDURE — 17000 DESTRUCT PREMALG LESION: CPT

## 2017-05-10 PROCEDURE — ? IN-HOUSE DISPENSING PHARMACY

## 2017-05-10 PROCEDURE — ? COUNSELING

## 2017-05-10 PROCEDURE — ? LIQUID NITROGEN

## 2017-05-10 ASSESSMENT — LOCATION DETAILED DESCRIPTION DERM
LOCATION DETAILED: PERIUMBILICAL SKIN
LOCATION DETAILED: 1ST WEBSPACE RIGHT FOOT
LOCATION DETAILED: LEFT FOREHEAD
LOCATION DETAILED: RIGHT INFERIOR FOREHEAD

## 2017-05-10 ASSESSMENT — LOCATION SIMPLE DESCRIPTION DERM
LOCATION SIMPLE: RIGHT FOREHEAD
LOCATION SIMPLE: LEFT FOREHEAD
LOCATION SIMPLE: RIGHT FOOT
LOCATION SIMPLE: ABDOMEN

## 2017-05-10 ASSESSMENT — LOCATION ZONE DERM
LOCATION ZONE: FEET
LOCATION ZONE: TRUNK
LOCATION ZONE: FACE

## 2017-05-10 NOTE — PROCEDURE: OBSERVATION
Body Location Override (Optional - Billing Will Still Be Based On Selected Body Map Location If Applicable): R base of 3rd toe
Size Of Lesion In Cm (Optional): 1
Morphology Per Location (Optional): reddish brown papule
X Size Of Lesion In Cm (Optional): 0.6
Detail Level: Detailed

## 2017-05-10 NOTE — PROCEDURE: IN-HOUSE DISPENSING PHARMACY
Product 48 Unit Type: mg
Product 52 Units Dispensed: 0
Product 1 Unit Type: bottle(s)
Detail Level: Simple
Product 3 Price/Unit (In Dollars): 1
Product 2 Application Directions: Apply to brown spots at bedtime.  Start once or twice a week and gradually increase frequency as tolerated.
Name Of Product 1: Skin Lightening Cream #5
Product 3 Application Directions: Apply to base of upper eye lashes at bedtime.
Product 1 Application Directions: Apply to brown spots twice daily under skin care products and sunscreen.
Render Product Pricing In Note: No
Name Of Product 2: HQRA +
Send Charges To Patient Encounter: Yes
Name Of Product 3: Latisse

## 2018-04-24 ENCOUNTER — APPOINTMENT (RX ONLY)
Dept: URBAN - NONMETROPOLITAN AREA CLINIC 1 | Facility: CLINIC | Age: 73
Setting detail: DERMATOLOGY
End: 2018-04-24

## 2018-04-24 DIAGNOSIS — L70.8 OTHER ACNE: ICD-10-CM

## 2018-04-24 DIAGNOSIS — L57.0 ACTINIC KERATOSIS: ICD-10-CM

## 2018-04-24 PROCEDURE — ? COUNSELING

## 2018-04-24 PROCEDURE — ? LIQUID NITROGEN

## 2018-04-24 PROCEDURE — 17000 DESTRUCT PREMALG LESION: CPT

## 2018-04-24 PROCEDURE — 99213 OFFICE O/P EST LOW 20 MIN: CPT | Mod: 25

## 2018-04-24 ASSESSMENT — LOCATION SIMPLE DESCRIPTION DERM
LOCATION SIMPLE: LEFT EYEBROW
LOCATION SIMPLE: RIGHT CHEEK

## 2018-04-24 ASSESSMENT — LOCATION ZONE DERM: LOCATION ZONE: FACE

## 2018-04-24 ASSESSMENT — LOCATION DETAILED DESCRIPTION DERM
LOCATION DETAILED: RIGHT CENTRAL MALAR CHEEK
LOCATION DETAILED: LEFT EYEBROW

## 2018-04-24 NOTE — PROCEDURE: LIQUID NITROGEN
Detail Level: Simple
Duration Of Freeze Thaw-Cycle (Seconds): 8
Consent: The patient's consent was obtained including but not limited to risks of crusting, scabbing, blistering, scarring, darker or lighter pigmentary change, recurrence, incomplete removal and infection.
Number Of Freeze-Thaw Cycles: 1 freeze-thaw cycle
Render Post-Care Instructions In Note?: no
Post-Care Instructions: I reviewed with the patient in detail post-care instructions. Patient is to wear sunprotection, and avoid picking at any of the treated lesions. Pt may apply Vaseline to crusted or scabbing areas.

## 2018-12-07 ENCOUNTER — APPOINTMENT (RX ONLY)
Dept: URBAN - NONMETROPOLITAN AREA CLINIC 1 | Facility: CLINIC | Age: 73
Setting detail: DERMATOLOGY
End: 2018-12-07

## 2018-12-07 DIAGNOSIS — D18.0 HEMANGIOMA: ICD-10-CM

## 2018-12-07 DIAGNOSIS — B00.1 HERPESVIRAL VESICULAR DERMATITIS: ICD-10-CM

## 2018-12-07 DIAGNOSIS — L82.1 OTHER SEBORRHEIC KERATOSIS: ICD-10-CM

## 2018-12-07 PROBLEM — D18.01 HEMANGIOMA OF SKIN AND SUBCUTANEOUS TISSUE: Status: ACTIVE | Noted: 2018-12-07

## 2018-12-07 PROCEDURE — ? LIQUID NITROGEN (COSMETIC)

## 2018-12-07 PROCEDURE — ? PRESCRIPTION

## 2018-12-07 PROCEDURE — ? COUNSELING

## 2018-12-07 PROCEDURE — 99213 OFFICE O/P EST LOW 20 MIN: CPT

## 2018-12-07 RX ORDER — VALACYCLOVIR 1 G/1
TABLET ORAL QD
Qty: 30 | Refills: 3 | Status: ERX | COMMUNITY
Start: 2018-12-07

## 2018-12-07 RX ADMIN — VALACYCLOVIR 2: 1 TABLET ORAL at 00:00

## 2018-12-07 ASSESSMENT — LOCATION SIMPLE DESCRIPTION DERM
LOCATION SIMPLE: RIGHT FOREHEAD
LOCATION SIMPLE: RIGHT LIP
LOCATION SIMPLE: ABDOMEN

## 2018-12-07 ASSESSMENT — LOCATION DETAILED DESCRIPTION DERM
LOCATION DETAILED: RIGHT LOWER CUTANEOUS LIP
LOCATION DETAILED: XIPHOID
LOCATION DETAILED: RIGHT LATERAL FOREHEAD

## 2018-12-07 ASSESSMENT — LOCATION ZONE DERM
LOCATION ZONE: FACE
LOCATION ZONE: TRUNK
LOCATION ZONE: LIP

## 2019-12-20 ENCOUNTER — APPOINTMENT (RX ONLY)
Dept: URBAN - NONMETROPOLITAN AREA CLINIC 1 | Facility: CLINIC | Age: 74
Setting detail: DERMATOLOGY
End: 2019-12-20

## 2019-12-20 DIAGNOSIS — L82.1 OTHER SEBORRHEIC KERATOSIS: ICD-10-CM

## 2019-12-20 DIAGNOSIS — L57.0 ACTINIC KERATOSIS: ICD-10-CM

## 2019-12-20 DIAGNOSIS — L81.4 OTHER MELANIN HYPERPIGMENTATION: ICD-10-CM

## 2019-12-20 DIAGNOSIS — D18.0 HEMANGIOMA: ICD-10-CM

## 2019-12-20 DIAGNOSIS — D22 MELANOCYTIC NEVI: ICD-10-CM

## 2019-12-20 DIAGNOSIS — M71 OTHER BURSOPATHIES: ICD-10-CM

## 2019-12-20 PROBLEM — M71.342 OTHER BURSAL CYST, LEFT HAND: Status: ACTIVE | Noted: 2019-12-20

## 2019-12-20 PROBLEM — D18.01 HEMANGIOMA OF SKIN AND SUBCUTANEOUS TISSUE: Status: ACTIVE | Noted: 2019-12-20

## 2019-12-20 PROBLEM — D22.71 MELANOCYTIC NEVI OF RIGHT LOWER LIMB, INCLUDING HIP: Status: ACTIVE | Noted: 2019-12-20

## 2019-12-20 PROBLEM — D22.62 MELANOCYTIC NEVI OF LEFT UPPER LIMB, INCLUDING SHOULDER: Status: ACTIVE | Noted: 2019-12-20

## 2019-12-20 PROBLEM — D22.5 MELANOCYTIC NEVI OF TRUNK: Status: ACTIVE | Noted: 2019-12-20

## 2019-12-20 PROCEDURE — ? COUNSELING

## 2019-12-20 PROCEDURE — 99214 OFFICE O/P EST MOD 30 MIN: CPT | Mod: 25

## 2019-12-20 PROCEDURE — ? PRESCRIPTION

## 2019-12-20 PROCEDURE — ? LIQUID NITROGEN

## 2019-12-20 PROCEDURE — 17000 DESTRUCT PREMALG LESION: CPT

## 2019-12-20 PROCEDURE — ? OBSERVATION

## 2019-12-20 RX ORDER — HYDROQUINONE 4 %
CREAM (GRAM) TOPICAL
Qty: 1 | Refills: 3 | COMMUNITY
Start: 2019-12-20

## 2019-12-20 RX ADMIN — Medication 1: at 00:00

## 2019-12-20 ASSESSMENT — LOCATION SIMPLE DESCRIPTION DERM
LOCATION SIMPLE: LEFT FOREHEAD
LOCATION SIMPLE: RIGHT FOOT
LOCATION SIMPLE: LEFT OCCIPITAL SCALP
LOCATION SIMPLE: LEFT MIDDLE FINGER
LOCATION SIMPLE: LEFT HAND
LOCATION SIMPLE: ABDOMEN
LOCATION SIMPLE: RIGHT FOREHEAD
LOCATION SIMPLE: RIGHT UPPER BACK

## 2019-12-20 ASSESSMENT — LOCATION DETAILED DESCRIPTION DERM
LOCATION DETAILED: LEFT DISTAL DORSAL MIDDLE FINGER
LOCATION DETAILED: RIGHT SUPERIOR MEDIAL UPPER BACK
LOCATION DETAILED: PERIUMBILICAL SKIN
LOCATION DETAILED: 1ST WEBSPACE RIGHT FOOT
LOCATION DETAILED: LEFT RADIAL PALM
LOCATION DETAILED: LEFT SUPERIOR OCCIPITAL SCALP
LOCATION DETAILED: RIGHT SUPERIOR FOREHEAD
LOCATION DETAILED: LEFT MEDIAL FOREHEAD

## 2019-12-20 ASSESSMENT — LOCATION ZONE DERM
LOCATION ZONE: SCALP
LOCATION ZONE: FEET
LOCATION ZONE: TRUNK
LOCATION ZONE: FACE
LOCATION ZONE: FINGER
LOCATION ZONE: HAND

## 2020-09-22 ENCOUNTER — APPOINTMENT (RX ONLY)
Dept: URBAN - NONMETROPOLITAN AREA CLINIC 1 | Facility: CLINIC | Age: 75
Setting detail: DERMATOLOGY
End: 2020-09-22

## 2020-09-22 VITALS — TEMPERATURE: 97 F

## 2020-09-22 DIAGNOSIS — M71 OTHER BURSOPATHIES: ICD-10-CM

## 2020-09-22 DIAGNOSIS — D22 MELANOCYTIC NEVI: ICD-10-CM

## 2020-09-22 DIAGNOSIS — B35.3 TINEA PEDIS: ICD-10-CM

## 2020-09-22 DIAGNOSIS — B00.1 HERPESVIRAL VESICULAR DERMATITIS: ICD-10-CM

## 2020-09-22 DIAGNOSIS — L82.1 OTHER SEBORRHEIC KERATOSIS: ICD-10-CM

## 2020-09-22 DIAGNOSIS — L57.0 ACTINIC KERATOSIS: ICD-10-CM

## 2020-09-22 PROBLEM — D22.62 MELANOCYTIC NEVI OF LEFT UPPER LIMB, INCLUDING SHOULDER: Status: ACTIVE | Noted: 2020-09-22

## 2020-09-22 PROBLEM — D22.5 MELANOCYTIC NEVI OF TRUNK: Status: ACTIVE | Noted: 2020-09-22

## 2020-09-22 PROBLEM — D22.71 MELANOCYTIC NEVI OF RIGHT LOWER LIMB, INCLUDING HIP: Status: ACTIVE | Noted: 2020-09-22

## 2020-09-22 PROBLEM — M71.342 OTHER BURSAL CYST, LEFT HAND: Status: ACTIVE | Noted: 2020-09-22

## 2020-09-22 PROCEDURE — ? NOTED ON EXAM BUT NOT TREATED

## 2020-09-22 PROCEDURE — 17003 DESTRUCT PREMALG LES 2-14: CPT

## 2020-09-22 PROCEDURE — ? PRESCRIPTION

## 2020-09-22 PROCEDURE — 99214 OFFICE O/P EST MOD 30 MIN: CPT | Mod: 25

## 2020-09-22 PROCEDURE — ? LIQUID NITROGEN

## 2020-09-22 PROCEDURE — ? COUNSELING

## 2020-09-22 PROCEDURE — 17000 DESTRUCT PREMALG LESION: CPT

## 2020-09-22 PROCEDURE — ? OBSERVATION

## 2020-09-22 RX ORDER — ACYCLOVIR 400 MG/1
1 TABLET ORAL 3 TIMES DAILY
Qty: 30 | Refills: 6 | Status: ERX | COMMUNITY
Start: 2020-09-22

## 2020-09-22 RX ADMIN — ACYCLOVIR 1: 400 TABLET ORAL at 00:00

## 2020-09-22 ASSESSMENT — LOCATION ZONE DERM
LOCATION ZONE: ARM
LOCATION ZONE: ARM
LOCATION ZONE: TRUNK
LOCATION ZONE: FINGER
LOCATION ZONE: FEET
LOCATION ZONE: HAND
LOCATION ZONE: SCALP

## 2020-09-22 ASSESSMENT — LOCATION DETAILED DESCRIPTION DERM
LOCATION DETAILED: RIGHT MEDIAL PLANTAR MIDFOOT
LOCATION DETAILED: LEFT MEDIAL PLANTAR MIDFOOT
LOCATION DETAILED: RIGHT ANTERIOR DISTAL UPPER ARM
LOCATION DETAILED: LEFT RADIAL PALM
LOCATION DETAILED: PERIUMBILICAL SKIN
LOCATION DETAILED: RIGHT ANTERIOR DISTAL UPPER ARM
LOCATION DETAILED: LEFT DISTAL DORSAL MIDDLE FINGER
LOCATION DETAILED: LEFT SUPERIOR PARIETAL SCALP
LOCATION DETAILED: 1ST WEBSPACE RIGHT FOOT
LOCATION DETAILED: POSTERIOR MID-PARIETAL SCALP

## 2020-09-22 ASSESSMENT — LOCATION SIMPLE DESCRIPTION DERM
LOCATION SIMPLE: LEFT HAND
LOCATION SIMPLE: LEFT MIDDLE FINGER
LOCATION SIMPLE: RIGHT FOOT
LOCATION SIMPLE: RIGHT PLANTAR SURFACE
LOCATION SIMPLE: POSTERIOR SCALP
LOCATION SIMPLE: SCALP
LOCATION SIMPLE: LEFT PLANTAR SURFACE
LOCATION SIMPLE: RIGHT UPPER ARM
LOCATION SIMPLE: ABDOMEN
LOCATION SIMPLE: RIGHT UPPER ARM

## 2020-09-22 NOTE — PROCEDURE: OBSERVATION
Detail Level: Detailed
Morphology Per Location (Optional): reddish brown papule
X Size Of Lesion In Cm (Optional): 0.6
Size Of Lesion In Cm (Optional): 1
Body Location Override (Optional - Billing Will Still Be Based On Selected Body Map Location If Applicable): R base of 3rd toe
X Size Of Lesion In Cm (Optional): 0.2
Size Of Lesion In Cm (Optional): 0.3

## 2020-09-22 NOTE — PROCEDURE: LIQUID NITROGEN
Duration Of Freeze Thaw-Cycle (Seconds): 0
Number Of Freeze-Thaw Cycles: 2 freeze-thaw cycles
Detail Level: Simple
Render Note In Bullet Format When Appropriate: No
Post-Care Instructions: I reviewed with the patient in detail post-care instructions. Patient is to wear sunprotection, and avoid picking at any of the treated lesions. Pt may apply Vaseline to crusted or scabbing areas.
Consent: The patient's consent was obtained including but not limited to risks of crusting, scabbing, blistering, scarring, darker or lighter pigmentary change, recurrence, incomplete removal and infection.